# Patient Record
Sex: MALE | Race: OTHER | HISPANIC OR LATINO | URBAN - METROPOLITAN AREA
[De-identification: names, ages, dates, MRNs, and addresses within clinical notes are randomized per-mention and may not be internally consistent; named-entity substitution may affect disease eponyms.]

---

## 2022-12-05 ENCOUNTER — EMERGENCY (EMERGENCY)
Facility: HOSPITAL | Age: 22
LOS: 0 days | Discharge: HOME | End: 2022-12-05
Attending: EMERGENCY MEDICINE
Payer: COMMERCIAL

## 2022-12-05 VITALS
OXYGEN SATURATION: 100 % | TEMPERATURE: 99 F | SYSTOLIC BLOOD PRESSURE: 137 MMHG | HEART RATE: 80 BPM | DIASTOLIC BLOOD PRESSURE: 84 MMHG | RESPIRATION RATE: 18 BRPM

## 2022-12-05 VITALS
DIASTOLIC BLOOD PRESSURE: 78 MMHG | TEMPERATURE: 98 F | RESPIRATION RATE: 18 BRPM | HEART RATE: 79 BPM | SYSTOLIC BLOOD PRESSURE: 137 MMHG | OXYGEN SATURATION: 100 % | HEIGHT: 64 IN | WEIGHT: 160.06 LBS

## 2022-12-05 DIAGNOSIS — M25.512 PAIN IN LEFT SHOULDER: ICD-10-CM
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
## 2022-12-05 DIAGNOSIS — S62.102A FRACTURE OF UNSPECIFIED CARPAL BONE, LEFT WRIST, INITIAL ENCOUNTER FOR CLOSED FRACTURE: ICD-10-CM

## 2022-12-05 DIAGNOSIS — M25.522 PAIN IN LEFT ELBOW: ICD-10-CM
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
## 2022-12-05 DIAGNOSIS — S52.502A UNSPECIFIED FRACTURE OF THE LOWER END OF LEFT RADIUS, INITIAL ENCOUNTER FOR CLOSED FRACTURE: ICD-10-CM

## 2022-12-05 DIAGNOSIS — S13.9XXA SPRAIN OF JOINTS AND LIGAMENTS OF UNSPECIFIED PARTS OF NECK, INITIAL ENCOUNTER: ICD-10-CM

## 2022-12-05 DIAGNOSIS — S52.602A UNSPECIFIED FRACTURE OF LOWER END OF LEFT ULNA, INITIAL ENCOUNTER FOR CLOSED FRACTURE: ICD-10-CM
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
## 2022-12-05 LAB
ALBUMIN SERPL ELPH-MCNC: 4.1 G/DL — SIGNIFICANT CHANGE UP (ref 3.3–5)
ALP SERPL-CCNC: 130 U/L — HIGH (ref 40–120)
ALT FLD-CCNC: 39 U/L — SIGNIFICANT CHANGE UP (ref 12–78)
ANION GAP SERPL CALC-SCNC: 4 MMOL/L — LOW (ref 5–17)
APTT BLD: 30.4 SEC — SIGNIFICANT CHANGE UP (ref 27.5–35.5)
AST SERPL-CCNC: 22 U/L — SIGNIFICANT CHANGE UP (ref 15–37)
BASOPHILS # BLD AUTO: 0.05 K/UL — SIGNIFICANT CHANGE UP (ref 0–0.2)
BASOPHILS NFR BLD AUTO: 0.5 % — SIGNIFICANT CHANGE UP (ref 0–2)
BILIRUB SERPL-MCNC: 0.2 MG/DL — SIGNIFICANT CHANGE UP (ref 0.2–1.2)
BUN SERPL-MCNC: 16 MG/DL — SIGNIFICANT CHANGE UP (ref 7–23)
CALCIUM SERPL-MCNC: 9.1 MG/DL — SIGNIFICANT CHANGE UP (ref 8.5–10.1)
CHLORIDE SERPL-SCNC: 108 MMOL/L — SIGNIFICANT CHANGE UP (ref 96–108)
CO2 SERPL-SCNC: 27 MMOL/L — SIGNIFICANT CHANGE UP (ref 22–31)
CREAT SERPL-MCNC: 0.94 MG/DL — SIGNIFICANT CHANGE UP (ref 0.5–1.3)
EGFR: 118 ML/MIN/1.73M2 — SIGNIFICANT CHANGE UP
EOSINOPHIL # BLD AUTO: 0.26 K/UL — SIGNIFICANT CHANGE UP (ref 0–0.5)
EOSINOPHIL NFR BLD AUTO: 2.9 % — SIGNIFICANT CHANGE UP (ref 0–6)
GLUCOSE SERPL-MCNC: 94 MG/DL — SIGNIFICANT CHANGE UP (ref 70–99)
HCT VFR BLD CALC: 43.8 % — SIGNIFICANT CHANGE UP (ref 39–50)
HGB BLD-MCNC: 14.9 G/DL — SIGNIFICANT CHANGE UP (ref 13–17)
IMM GRANULOCYTES NFR BLD AUTO: 0.1 % — SIGNIFICANT CHANGE UP (ref 0–0.9)
INR BLD: 1.03 RATIO — SIGNIFICANT CHANGE UP (ref 0.88–1.16)
LYMPHOCYTES # BLD AUTO: 2.57 K/UL — SIGNIFICANT CHANGE UP (ref 1–3.3)
LYMPHOCYTES # BLD AUTO: 28.2 % — SIGNIFICANT CHANGE UP (ref 13–44)
MCHC RBC-ENTMCNC: 29.6 PG — SIGNIFICANT CHANGE UP (ref 27–34)
MCHC RBC-ENTMCNC: 34 GM/DL — SIGNIFICANT CHANGE UP (ref 32–36)
MCV RBC AUTO: 87.1 FL — SIGNIFICANT CHANGE UP (ref 80–100)
MONOCYTES # BLD AUTO: 0.71 K/UL — SIGNIFICANT CHANGE UP (ref 0–0.9)
MONOCYTES NFR BLD AUTO: 7.8 % — SIGNIFICANT CHANGE UP (ref 2–14)
NEUTROPHILS # BLD AUTO: 5.5 K/UL — SIGNIFICANT CHANGE UP (ref 1.8–7.4)
NEUTROPHILS NFR BLD AUTO: 60.5 % — SIGNIFICANT CHANGE UP (ref 43–77)
PLATELET # BLD AUTO: 262 K/UL — SIGNIFICANT CHANGE UP (ref 150–400)
POTASSIUM SERPL-MCNC: 3.8 MMOL/L — SIGNIFICANT CHANGE UP (ref 3.5–5.3)
POTASSIUM SERPL-SCNC: 3.8 MMOL/L — SIGNIFICANT CHANGE UP (ref 3.5–5.3)
PROT SERPL-MCNC: 8 GM/DL — SIGNIFICANT CHANGE UP (ref 6–8.3)
PROTHROM AB SERPL-ACNC: 12 SEC — SIGNIFICANT CHANGE UP (ref 10.5–13.4)
RBC # BLD: 5.03 M/UL — SIGNIFICANT CHANGE UP (ref 4.2–5.8)
RBC # FLD: 13 % — SIGNIFICANT CHANGE UP (ref 10.3–14.5)
SODIUM SERPL-SCNC: 139 MMOL/L — SIGNIFICANT CHANGE UP (ref 135–145)
WBC # BLD: 9.1 K/UL — SIGNIFICANT CHANGE UP (ref 3.8–10.5)
WBC # FLD AUTO: 9.1 K/UL — SIGNIFICANT CHANGE UP (ref 3.8–10.5)

## 2022-12-05 PROCEDURE — 73070 X-RAY EXAM OF ELBOW: CPT | Mod: 26,LT

## 2022-12-05 PROCEDURE — 96374 THER/PROPH/DIAG INJ IV PUSH: CPT | Mod: XU

## 2022-12-05 PROCEDURE — 99285 EMERGENCY DEPT VISIT HI MDM: CPT | Mod: 25

## 2022-12-05 PROCEDURE — 70496 CT ANGIOGRAPHY HEAD: CPT | Mod: 26,MA

## 2022-12-05 PROCEDURE — 99285 EMERGENCY DEPT VISIT HI MDM: CPT

## 2022-12-05 PROCEDURE — 74177 CT ABD & PELVIS W/CONTRAST: CPT | Mod: 26,MA

## 2022-12-05 PROCEDURE — 71260 CT THORAX DX C+: CPT | Mod: 26,MA

## 2022-12-05 PROCEDURE — 73060 X-RAY EXAM OF HUMERUS: CPT | Mod: 26,LT

## 2022-12-05 PROCEDURE — 73030 X-RAY EXAM OF SHOULDER: CPT | Mod: 26,LT

## 2022-12-05 PROCEDURE — 72125 CT NECK SPINE W/O DYE: CPT | Mod: 26,MA

## 2022-12-05 PROCEDURE — 72125 CT NECK SPINE W/O DYE: CPT | Mod: MA

## 2022-12-05 PROCEDURE — 73130 X-RAY EXAM OF HAND: CPT | Mod: LT

## 2022-12-05 PROCEDURE — 96376 TX/PRO/DX INJ SAME DRUG ADON: CPT | Mod: XU

## 2022-12-05 PROCEDURE — 70496 CT ANGIOGRAPHY HEAD: CPT | Mod: MA

## 2022-12-05 PROCEDURE — 85610 PROTHROMBIN TIME: CPT

## 2022-12-05 PROCEDURE — 70450 CT HEAD/BRAIN W/O DYE: CPT | Mod: MA

## 2022-12-05 PROCEDURE — 36415 COLL VENOUS BLD VENIPUNCTURE: CPT

## 2022-12-05 PROCEDURE — 73060 X-RAY EXAM OF HUMERUS: CPT | Mod: LT

## 2022-12-05 PROCEDURE — 73090 X-RAY EXAM OF FOREARM: CPT | Mod: 26,LT,76

## 2022-12-05 PROCEDURE — 73070 X-RAY EXAM OF ELBOW: CPT | Mod: LT

## 2022-12-05 PROCEDURE — 99284 EMERGENCY DEPT VISIT MOD MDM: CPT

## 2022-12-05 PROCEDURE — 85730 THROMBOPLASTIN TIME PARTIAL: CPT

## 2022-12-05 PROCEDURE — 80053 COMPREHEN METABOLIC PANEL: CPT

## 2022-12-05 PROCEDURE — 71260 CT THORAX DX C+: CPT | Mod: MA

## 2022-12-05 PROCEDURE — 73100 X-RAY EXAM OF WRIST: CPT | Mod: 26,LT

## 2022-12-05 PROCEDURE — 73030 X-RAY EXAM OF SHOULDER: CPT | Mod: LT

## 2022-12-05 PROCEDURE — 70450 CT HEAD/BRAIN W/O DYE: CPT | Mod: 26,MA

## 2022-12-05 PROCEDURE — 85025 COMPLETE CBC W/AUTO DIFF WBC: CPT

## 2022-12-05 PROCEDURE — 73100 X-RAY EXAM OF WRIST: CPT | Mod: 26,LT,77

## 2022-12-05 PROCEDURE — 73100 X-RAY EXAM OF WRIST: CPT | Mod: LT

## 2022-12-05 PROCEDURE — 74177 CT ABD & PELVIS W/CONTRAST: CPT | Mod: MA

## 2022-12-05 PROCEDURE — 73130 X-RAY EXAM OF HAND: CPT | Mod: 26,LT

## 2022-12-05 PROCEDURE — 25605 CLTX DST RDL FX/EPHYS SEP W/: CPT | Mod: LT

## 2022-12-05 PROCEDURE — 73090 X-RAY EXAM OF FOREARM: CPT | Mod: LT

## 2022-12-05 RX ORDER — HYDROMORPHONE HYDROCHLORIDE 2 MG/ML
1 INJECTION INTRAMUSCULAR; INTRAVENOUS; SUBCUTANEOUS ONCE
Refills: 0 | Status: DISCONTINUED | OUTPATIENT
Start: 2022-12-05 | End: 2022-12-05

## 2022-12-05 RX ORDER — SODIUM CHLORIDE 9 MG/ML
1000 INJECTION INTRAMUSCULAR; INTRAVENOUS; SUBCUTANEOUS ONCE
Refills: 0 | Status: COMPLETED | OUTPATIENT
Start: 2022-12-05 | End: 2022-12-05

## 2022-12-05 RX ORDER — OXYCODONE AND ACETAMINOPHEN 5; 325 MG/1; MG/1
1 TABLET ORAL
Qty: 12 | Refills: 0
Start: 2022-12-05

## 2022-12-05 RX ADMIN — SODIUM CHLORIDE 1000 MILLILITER(S): 9 INJECTION INTRAMUSCULAR; INTRAVENOUS; SUBCUTANEOUS at 14:35

## 2022-12-05 RX ADMIN — HYDROMORPHONE HYDROCHLORIDE 1 MILLIGRAM(S): 2 INJECTION INTRAMUSCULAR; INTRAVENOUS; SUBCUTANEOUS at 14:00

## 2022-12-05 RX ADMIN — HYDROMORPHONE HYDROCHLORIDE 1 MILLIGRAM(S): 2 INJECTION INTRAMUSCULAR; INTRAVENOUS; SUBCUTANEOUS at 16:49

## 2022-12-05 NOTE — ED PROVIDER NOTE - NS_ ATTENDINGSCRIBEDETAILS _ED_A_ED_FT
I, Brad Davis MD,  performed the initial face to face bedside interview with this patient regarding history of present illness, review of symptoms and relevant past medical, social and family history.  I completed an independent physical examination.  I was the initial provider who evaluated this patient.   I personally saw the patient and performed a substantive portion of the visit including all aspects of the medical decision making.  The history, relevant review of systems, past medical and surgical history, medical decision making, and physical examination was documented by the scribe in my presence and I attest to the accuracy of the documentation.

## 2022-12-05 NOTE — ED PROVIDER NOTE - ENMT, MLM
Airway patent, Nasal mucosa clear. Mouth with normal mucosa. Throat has no vesicles, no oropharyngeal exudates and uvula is midline. Airway patent, Nasal mucosa clear. Mouth with normal mucosa. Throat is clear.

## 2022-12-05 NOTE — ED ADULT NURSE NOTE - OBJECTIVE STATEMENT
Pt BIBEMS s/p fall. Pt was working on a roof when he fell off of a ladder, about 10 feet. Pt denies LOC or headstrike. Pt w sling to L arm present, c/o l elbow pain. Trauma alert activated. Dr Davis notified. Pt brought to CT scan. IV inserted and blood speciment sent. See trauma sheet.

## 2022-12-05 NOTE — ED PROVIDER NOTE - CARE PROVIDER_API CALL
Juan Ramon Quiroz)  Orthopaedic Surgery; Surgery of the Hand  [address] [place] [address]  Phone: [phone]  Fax: [phone]  Follow Up Time: Urgent

## 2022-12-05 NOTE — ED ADULT TRIAGE NOTE - CHIEF COMPLAINT QUOTE
Pt BIBEMS s/p fall. Pt was working on a roof when he fell off of a ladder, about 10 feet. Pt denies LOC or headstrike. Pt w sling to l arm present, c/o l elbow pain. Trauma alert activated. Dr Susan pond.

## 2022-12-05 NOTE — CONSULT NOTE ADULT - SUBJECTIVE AND OBJECTIVE BOX
22y Male presents c/o LEFT wrist pain sp fall from height. He was zeus and fell about 10 feet. Witnessed by his brother. No LOC. PT seen by ED and pan scanned. Fall happened today.  Had immediate constant pain to the left wrist and some swelling and noted mild deformity as well. No alleviating factors including medication or positioning. He was splinted in the field and collared by EMS. Notably after the fall he was able to get up and walk. xrays here show minimally displaced left distal radius fracture. Denies LOC. Denies numbness/tingling. No other pain/injuries. Right Hand dominant. Pt seen and examined w Ortho Residents/PAs. Pt was treated with closed reduction under local anesthetic in the ED and splinted.    ROS: No CP, no SOB, No night sweats, no fevers or chills, no Numbness or tingling.     HEALTH ISSUES - PROBLEM Dx:        MEDICATIONS  (STANDING):    Allergies    No Known Allergies    Intolerances                              14.9   9.10  )-----------( 262      ( 05 Dec 2022 13:37 )             43.8     05 Dec 2022 13:37    139    |  108    |  16     ----------------------------<  94     3.8     |  27     |  0.94     Ca    9.1        05 Dec 2022 13:37    TPro  8.0    /  Alb  4.1    /  TBili  0.2    /  DBili  x      /  AST  22     /  ALT  39     /  AlkPhos  130    05 Dec 2022 13:37    PT/INR - ( 05 Dec 2022 13:37 )   PT: 12.0 sec;   INR: 1.03 ratio         PTT - ( 05 Dec 2022 13:37 )  PTT:30.4 sec  Vital Signs Last 24 Hrs  T(C): 36.9 (12-05-22 @ 13:32), Max: 36.9 (12-05-22 @ 13:32)  T(F): 98.4 (12-05-22 @ 13:32), Max: 98.4 (12-05-22 @ 13:32)  HR: 79 (12-05-22 @ 13:32) (79 - 79)  BP: 137/78 (12-05-22 @ 13:32) (137/78 - 137/78)  BP(mean): --  RR: 18 (12-05-22 @ 13:32) (18 - 18)  SpO2: 100% (12-05-22 @ 13:32) (100% - 100%)    Imaging: Xrays demonstrate Left distal radius fracture impacted and mildly dorsally angulated    Physical Exam  Gen: Nad, Alert, oriented, Follows Commands calm and pleasant  Head: Atraumatic, NC, no facial trauma  Neck/C-Spine: Palpated through the callar and no bony TTP no paraspinal TTP  T/L Spine: Nontender no step off  Breathing: Nonlaboraed  Pulse: Regular  Abdomen: Nondistended  Musculoskeletal:  LUE: Wrist swelling and mild deformity noted.  ELbow with small superficial abrasion otherwise. Skin intact, +TTP distal radius, no bony ttp elsewhere, compartments soft/compressive, extremity warm/well perfused. Sensation intact distal tips. Able to wiggle her fingers, intact AIN/PIN. No elbow or shoulder bony tenderness or swelling. Full painless AROM of elbow and shoulder.2+ radial pulse.   RUE: Moving at baseline shoulder, elbow, wrist, digits. No deformity or swelling. Painless baseline AROM shoulder, elbow, wrist.  Bilat LE: No swelling or deformity. Painless baseline APROM of hips, knees, ankles, toes. Able to actively SLR.  Negative HS/Log roll. 2+ DP pulses.     Procedure: 10 cc 1% lidocaine injected under sterile procedure into LEFT Distal radius fracture site. Time was allowed to achieve anesthetic effect.  Hand was hung using IV pole, rc and weights. Closed reduction performed. Placed in well padded sugartong splint, molded appropriately. Post procedure imaging obtained. Post procedure exam unchanged, NV intact, able to wiggles all fingers, SILT distally.        22y Male presents c/o LEFT wrist pain sp fall from height. He was zeus and fell about 10 feet. Witnessed by his brother. No LOC. PT seen by ED and pan scanned. Fall happened today.  Had immediate constant pain to the left wrist and some swelling and noted mild deformity as well. No alleviating factors including medication or positioning. He was splinted in the field and collared by EMS. Notably after the fall he was able to get up and walk. xrays here show minimally displaced left distal radius fracture. Denies LOC. Denies numbness/tingling. No other pain/injuries. Right Hand dominant. Pt seen and examined w Ortho Residents/PAs. Pt was treated with closed reduction under local anesthetic in the ED and splinted.    ROS: No CP, no SOB, No night sweats, no fevers or chills, no Numbness or tingling.     HEALTH ISSUES - PROBLEM Dx:        MEDICATIONS  (STANDING):    Allergies    No Known Allergies    Intolerances                              14.9   9.10  )-----------( 262      ( 05 Dec 2022 13:37 )             43.8     05 Dec 2022 13:37    139    |  108    |  16     ----------------------------<  94     3.8     |  27     |  0.94     Ca    9.1        05 Dec 2022 13:37    TPro  8.0    /  Alb  4.1    /  TBili  0.2    /  DBili  x      /  AST  22     /  ALT  39     /  AlkPhos  130    05 Dec 2022 13:37    PT/INR - ( 05 Dec 2022 13:37 )   PT: 12.0 sec;   INR: 1.03 ratio         PTT - ( 05 Dec 2022 13:37 )  PTT:30.4 sec  Vital Signs Last 24 Hrs  T(C): 36.9 (12-05-22 @ 13:32), Max: 36.9 (12-05-22 @ 13:32)  T(F): 98.4 (12-05-22 @ 13:32), Max: 98.4 (12-05-22 @ 13:32)  HR: 79 (12-05-22 @ 13:32) (79 - 79)  BP: 137/78 (12-05-22 @ 13:32) (137/78 - 137/78)  BP(mean): --  RR: 18 (12-05-22 @ 13:32) (18 - 18)  SpO2: 100% (12-05-22 @ 13:32) (100% - 100%)    Imaging: Xrays demonstrate Left distal radius fracture impacted and mildly dorsally angulated    Physical Exam  Gen: Nad, Alert, oriented, Follows Commands calm and pleasant  Head: Atraumatic, NC, no facial trauma  Neck/C-Spine: Palpated through the callar and no bony TTP no paraspinal TTP  T/L Spine: Nontender no step off  Breathing: Nonlaboraed  Pulse: Regular  Abdomen: Nondistended  Musculoskeletal:  LUE: Wrist swelling and mild deformity noted.  ELbow with small superficial abrasion otherwise. Skin intact, +TTP distal radius, no bony ttp elsewhere, compartments soft/compressive, extremity warm/well perfused. Sensation intact distal tips. Able to wiggle her fingers, intact AIN/PIN. No elbow or shoulder bony tenderness or swelling. Full painless AROM of elbow and shoulder.2+ radial pulse.   RUE: Moving at baseline shoulder, elbow, wrist, digits. No deformity or swelling. Painless baseline AROM shoulder, elbow, wrist.  Bilat LE: No swelling or deformity. Painless baseline APROM of hips, knees, ankles, toes. Able to actively SLR.  Negative HS/Log roll. 2+ DP pulses.     Procedure: Pt received IV dilaudid by ED/RN immediately prior to closed reduction. Then Ortho performed sterile skin prep using alcohol and injected 10 cc 1% lidocaine into LEFT Distal radius fracture site. Time was allowed to achieve anesthetic effect.  Hand was hung using IV pole, rc and weights. Closed reduction performed felt slight movement of fx peice. Placed in well padded sugartong splint, molded appropriately. Post procedure imaging obtained. Post procedure exam unchanged, NV intact, able to wiggles all fingers, SILT distally.        22y Male presents c/o LEFT wrist pain sp fall from height. He was zeus and fell about 10 feet. Witnessed by his brother. No LOC. PT seen by ED and pan scanned. Fall happened today.  Had immediate constant pain to the left wrist and some swelling and noted mild deformity as well. No alleviating factors including medication or positioning. He was splinted in the field and collared by EMS. Notably after the fall he was able to get up and walk. xrays here show minimally displaced left distal radius fracture. Denies LOC. Denies numbness/tingling. No other pain/injuries. Right Hand dominant. Pt seen and examined w Ortho Residents/PAs. Pt was treated with closed reduction under local anesthetic in the ED and splinted.    ROS: No CP, no SOB, No night sweats, no fevers or chills, no Numbness or tingling.     HEALTH ISSUES - PROBLEM Dx:        MEDICATIONS  (STANDING):    Allergies    No Known Allergies    Intolerances                              14.9   9.10  )-----------( 262      ( 05 Dec 2022 13:37 )             43.8     05 Dec 2022 13:37    139    |  108    |  16     ----------------------------<  94     3.8     |  27     |  0.94     Ca    9.1        05 Dec 2022 13:37    TPro  8.0    /  Alb  4.1    /  TBili  0.2    /  DBili  x      /  AST  22     /  ALT  39     /  AlkPhos  130    05 Dec 2022 13:37    PT/INR - ( 05 Dec 2022 13:37 )   PT: 12.0 sec;   INR: 1.03 ratio         PTT - ( 05 Dec 2022 13:37 )  PTT:30.4 sec  Vital Signs Last 24 Hrs  T(C): 36.9 (12-05-22 @ 13:32), Max: 36.9 (12-05-22 @ 13:32)  T(F): 98.4 (12-05-22 @ 13:32), Max: 98.4 (12-05-22 @ 13:32)  HR: 79 (12-05-22 @ 13:32) (79 - 79)  BP: 137/78 (12-05-22 @ 13:32) (137/78 - 137/78)  BP(mean): --  RR: 18 (12-05-22 @ 13:32) (18 - 18)  SpO2: 100% (12-05-22 @ 13:32) (100% - 100%)    Imaging: Xrays demonstrate Left distal radius fracture/ulnar styoid fx impacted and mildly dorsally angulated;   On CT Chest, possible base 3 metacarpal fx     Physical Exam  Gen: Nad, Alert, oriented, Follows Commands calm and pleasant  Head: Atraumatic, NC, no facial trauma  Neck/C-Spine: Palpated through the callar and no bony TTP no paraspinal TTP  T/L Spine: Nontender no step off  Breathing: Nonlaboraed  Pulse: Regular  Abdomen: Nondistended  Musculoskeletal:  LUE: Wrist swelling and mild deformity noted.  ELbow with small superficial abrasion otherwise. Skin intact, +TTP distal radius, no bony ttp elsewhere, compartments soft/compressive, extremity warm/well perfused. Sensation intact distal tips. Able to wiggle her fingers, intact AIN/PIN. No elbow or shoulder bony tenderness or swelling. Full painless AROM of elbow and shoulder.2+ radial pulse.   RUE: Moving at baseline shoulder, elbow, wrist, digits. No deformity or swelling. Painless baseline AROM shoulder, elbow, wrist.  Bilat LE: No swelling or deformity. Painless baseline APROM of hips, knees, ankles, toes. Able to actively SLR.  Negative HS/Log roll. 2+ DP pulses.     Procedure: Pt received IV dilaudid by ED/RN immediately prior to closed reduction. Then Ortho performed sterile skin prep using alcohol and injected 10 cc 1% lidocaine into LEFT Distal radius fracture site. Time was allowed to achieve anesthetic effect.  Hand was hung using IV pole, rc and weights. Closed reduction performed felt slight movement of fx peice. Placed in well padded sugartong splint, molded appropriately. Post procedure imaging obtained. Post procedure exam unchanged, NV intact, able to wiggles all fingers, SILT distally.

## 2022-12-05 NOTE — ED PROVIDER NOTE - HEME LYMPH, MLM
No adenopathy or splenomegaly. No cervical or inguinal lymphadenopathy. No adenopathy or splenomegaly. ~Damon Henry PA-C

## 2022-12-05 NOTE — ED ADULT NURSE NOTE - NSIMPLEMENTINTERV_GEN_ALL_ED
Implemented All Fall Risk Interventions:  Hiram to call system. Call bell, personal items and telephone within reach. Instruct patient to call for assistance. Room bathroom lighting operational. Non-slip footwear when patient is off stretcher. Physically safe environment: no spills, clutter or unnecessary equipment. Stretcher in lowest position, wheels locked, appropriate side rails in place. Provide visual cue, wrist band, yellow gown, etc. Monitor gait and stability. Monitor for mental status changes and reorient to person, place, and time. Review medications for side effects contributing to fall risk. Reinforce activity limits and safety measures with patient and family.

## 2022-12-05 NOTE — ED PROVIDER NOTE - OBJECTIVE STATEMENT
23 y/o male w/ no pertinent PMHx presents to the ED via EMS s/p fall. Pt reports he was working out a roof when he fell approx 10 foot roof ladder, denies head strike or LOC. Pt left arm in a sling. Pt c/o left elbow pain. No other complaints at this time. On arrival pt in C-collar and left arm in sling. 21 y/o male w/ no pertinent PMHx presents to the ED via EMS s/p fall. Pt reports he was working out a roof when he fell approx 10 foot roof ladder, denies head strike or LOC. Pt left arm in a sling. Pt c/o left elbow pain.  On arrival pt in C-collar and left arm in sling. trauma alert called

## 2022-12-05 NOTE — ED PROVIDER NOTE - CLINICAL SUMMARY MEDICAL DECISION MAKING FREE TEXT BOX
21 y/o male presents to the ED s/p 10 foot fall from roof. On exam pt w/ TTP to left shoulder and elbow, normal peripheral pulse, no other signs of traumatic injury. Trauma alert called. Will CT and reassess. 23 y/o male presents to the ED s/p 10 foot fall from roof. On exam pt w/ TTP to left shoulder and elbow, normal peripheral pulse, no other signs of traumatic injury. Trauma alert called. Will CT and reassess.      PA note: Left wrist fracture reduced by ortho team, see consult note. All labwork/radiology results discussed in detail with patient. Patient re-examined and re-evaluated. Patient feels much better at this time. ED evaluation, Diagnosis and management discussed with the patient in detail. Workup results discussed with ED attending, OK to [place] home. Close ORTHO follow up encouraged, aftercare to assist with scheduling appointment ASAP. Strict ED return instructions discussed in detail and patient given the opportunity to ask any questions about their discharge diagnosis and instructions. Patient verbalized understanding. ~ Damon Henry PA-C

## 2022-12-05 NOTE — ED PROVIDER NOTE - PATIENT PORTAL LINK FT
You can access the FollowMyHealth Patient Portal offered by [facility] by registering at the following website: [email or web address]. By joining [facility]’s FollowMyHealth portal, you will also be able to view your health information using other applications (apps) compatible with our system.

## 2022-12-05 NOTE — ED PROVIDER NOTE - PHYSICAL EXAMINATION
Constitutional: NAD AAOx3  Eyes: PERRLA EOMI  Head: Normocephalic atraumatic  Mouth: MMM  Cardiac: regular rate   Resp: Lungs CTAB  GI: Abd s/nt/nd  Neuro: CN2-12 intact  Skin: No visible rashes   MSK: TTP to left shoulder and elbow, normal peripheral pulses, no other signs of traumatic injury. Constitutional: NAD AAOx3  Eyes: PERRLA EOMI  Head: Normocephalic atraumatic  ENT: No sneed sign, no raccoon eyes  Mouth: MMM  Cardiac: regular rate   Resp: Lungs CTAB  GI: Abd s/nt/nd  Neuro: CN2-12 intact GCS 15 no neuro deficits  Skin: No rashes, no bruising to chest, back, abdomen or extremities  Msk: No midline spinal ttp, no ttp of facial bones, no ttp to chest wall, pelvis stable, TTP to left shoulder and elbow, normal peripheral pulses, no other signs of traumatic injury. Attending: Constitutional: NAD AAOx3  Eyes: PERRLA EOMI  Head: Normocephalic atraumatic  ENT: No sneed sign, no raccoon eyes  Mouth: MMM  Cardiac: regular rate   Resp: Lungs CTAB  GI: Abd s/nt/nd  Neuro: CN2-12 intact GCS 15 no neuro deficits  Skin: No rashes, no bruising to chest, back, abdomen or extremities  Msk: No midline spinal ttp, no ttp of facial bones, no ttp to chest wall, pelvis stable, TTP to left shoulder and elbow, normal peripheral pulses, no other signs of traumatic injury.

## 2022-12-05 NOTE — ED PROVIDER NOTE - CONSTITUTIONAL, MLM
normal... Well appearing, awake, alert, oriented to person, place, time/situation and in no apparent distress. PA: Well appearing, awake, alert, oriented to person, place, time/situation and in no apparent distress.

## 2022-12-05 NOTE — ED PROVIDER NOTE - MUSCULOSKELETAL, MLM
Spine appears normal, range of motion is not limited, no muscle or joint tenderness Spine appears normal, range of motion is not limited. LEFT WRIST: +Obvious deformity left wrist. +STS. +Tenderness. Painful ROM. NVI. 2+ distal pulses.

## 2022-12-05 NOTE — ED PROVIDER NOTE - PROGRESS NOTE DETAILS
+Distal radius/ulnar fracture. Spoke with Dr. Quiroz, will send resident down. CT head abnormal findings reviewed and discussed with patient, will get CT venogram. ~Damon Henry PA-C 23 y/o male w/ no pertinent PMHx presents to the ED via EMS s/p fall. Pt reports he was working out a roof when he fell approx 10 foot roof ladder, denies head strike or LOC. Pt left arm in a sling. Pt c/o left elbow pain.  On arrival pt in C-collar and left arm in sling. trauma alert called PA: Patient is a 23 y/o male with no pertinent PMHx who presents to ED c/o left arm injury after he fell from 10 feet height. Patient came to ED via EMS s/p fall. Pt reports he was working out a roof when he fell approx 10 foot roof ladder, denies head strike or LOC. Patient was placed in cervical collar in ED. TA was called. ~Damon Henry PA-C Waiting for ORTHO. ~Damon Henry PA-C PA note: Left wrist fracture reduced by ortho team, see consult note. All labwork/radiology results discussed in detail with patient. Patient re-examined and re-evaluated. Patient feels much better at this time. ED evaluation, Diagnosis and management discussed with the patient in detail. Workup results discussed with ED attending, OK to [place] home. Close ORTHO follow up encouraged, aftercare to assist with scheduling appointment ASAP. Strict ED return instructions discussed in detail and patient given the opportunity to ask any questions about their discharge diagnosis and instructions. Patient verbalized understanding. ~ Damon Henry PA-C

## 2022-12-05 NOTE — ED PROVIDER NOTE - NSFOLLOWUPINSTRUCTIONS_ED_ALL_ED_FT
WRIST FRACTURE IN ADULTS - General Information            Fractura de mike en adultos    LO QUE NECESITA SABER:    ¿Qué es cal fractura de mike?Se produce cal fractura de mike cuando se quiebran nora o más huesos de la mike.   Huesos del brazo del adulto         ¿Cuáles son los signos y síntomas de cal fractura de mike?  •Dolor, hinchazón y moretones en la mike lesionada      •Dolor en la mike que empeora cuando sostiene algo o ejerce presión en la mike      •Debilidad, pérdida de la sensación u hormigueo en la mano o mike lesionada      •Dificultad para  la mike, la mano o los dedos      •Un cambio en la forma de la mike      ¿Cómo se diagnostica cal fractura de mike?Costello médico lo examinará. Es posible que tengan que realizarle cal radiografía, cal tomografía computarizada o cal resonancia magnética. Es posible que le den líquido de contraste para a que los huesos de costello mike se vean con más claridad en las imágenes. Dígale al médico si usted alguna vez ha tenido cal reacción alérgica al líquido de contraste. No entre a la ha donde se realiza la resonancia magnética con algo de metal. El metal puede causar lesiones serias. Dígale al médico si usted tiene algo de metal dentro de costello cuerpo o por encima.    ¿Cómo se trata cal fractura de mike?El tratamiento dependerá del hueso que se haya fracturado y el tipo de fractura que tenga. Es posible que usted necesite alguno de los siguientes:   •Medicamentospara ayudar a disminuir el dolor y la inflamación. Es posible también que necesite garland un antibiótico o darse la vacuna antitetánica si se abrió la piel.      •Puede que le coloquen un yeso, cal abrazadera o cal férulaen la mike para limitar el movimiento. Estos dispositivos ayudarán a mantener los huesos en costello lugar mientras sanan.      •La tracciónpuede ser necesaria si costello hueso se quebró en dos pedazos. La tracción ifeoma los huesos para colocarlos nuevamente en costello lugar. Es posible que se coloque un clavo en costello hueso o yeso y se enganche a unas cuerdas y a cal polea. El peso se jonelle en las cuerdas para ayudar a jalar los huesos y así ayudarlos a sanar correctamente.      •Cal reducción cerradaes un procedimiento que se realiza colocar los huesos en la posición correcta sin necesidad de cirugía.      •La cirugíapodría ser necesaria para regresar mohan huesos a costello posición correcta. Es posible que se usen alambres, clavijas, tornillos o placas para sujetar los huesos en costello lugar.      ¿Cómo puedo controlar los síntomas?  •Descanselo más posible. No practique deportes de contacto hasta que costello médico lo autorice a hacerlo.      •Aplique hieloen la mike de 15 a 20 minutos cada hora o doroteo se le indique. Use cal compresa de hielo o ponga hielo triturado en cal bolsa de plástico. Envuelva el hielo con cal toalla antes de colocarlo sobre costello piel. El hielo ayuda a evitar daño al tejido y a disminuir la inflamación y el dolor.      •Elevesu mike por encima del nivel de costello corazón con la mayor frecuencia posible. Repton va a disminuir inflamación y el dolor. Apoye costello mike sobre almohadas o mantas para mantenerla elevada cómodamente.             •Vaya a terapia físicasegún las indicaciones. Es posible que deba hacer fisioterapia cal vez que se le haya curado la mike y le hayan quitado el yeso. Un fisioterapeuta puede enseñarle ejercicios para mejorar el movimiento y la fuerza y para aliviar el dolor.      ¿Cuándo tiara buscar atención inmediata?  •Costello dolor empeora o no mejora después de garland el medicamento para el dolor.      •El yeso o la férula se rompen, se mojan o se dañan.      •Tiene la mano o los dedos de la mano entumecidos.      •Las rey o los dedos se le ponen blancos o azules.      •El yeso o la férula se sienten muy apretados.      •Tiene más dolor o inflamación que antes de que le colocaran el yeso o la férula.      ¿Cuándo tiara llamar a mi médico?  •Tiene fiebre.      •Sale mal olor o shiva de debajo del yeso.      •Usted tiene preguntas o inquietudes acerca de costello condición o cuidado.      ACUERDOS SOBRE COSTELLO CUIDADO:    Usted tiene el derecho de ayudar a planear costello cuidado. Aprenda todo lo que pueda sobre costello condición y doroteo darle tratamiento. Discuta mohan opciones de tratamiento con mohan médicos para decidir el cuidado que usted desea recibir. Usted siempre tiene el derecho de rechazar el tratamiento.

## 2022-12-05 NOTE — CONSULT NOTE ADULT - ASSESSMENT
A/P: 22y Male s/p closed reduction Left distal radius fracture, closed.  Consider trauma eval due to fall from height  Pain control  NWB Left UE in splint, keep c/d/I,   Elevation of hand and encouraged to wiggle digits few times per hour  Advised to keep splint on and to keep it dry  Si/sx compartment syndrome discussed with patient, told to return to ED if exhibit any  Possible need for surgical intervention as outpt discussed today, and to be further discussed in office w   Follow up with Dr. EDWARDS within 3-5 days , call office for appointment   Plan relayed to ED   Ortho stable for DC  Above as discussed with orthopedic Attending Dr dEwards A/P: 22y Male s/p closed reduction Left distal radius/ulna fracture, closed, possible ND base 3rd metacarpal fx     Consider trauma eval due to fall from height  Pain control  NWB Left UE in splint, keep c/d/I,   Elevation of hand and encouraged to wiggle digits few times per hour  Advised to keep splint on and to keep it dry  Si/sx compartment syndrome discussed with patient, told to return to ED if exhibit any  Possible need for surgical intervention as outpt discussed today, and to be further discussed in office w   Follow up with Dr. EDWARDS within 3-5 days , call office for appointment   Plan relayed to ED   Ortho stable for DC  Above as discussed with orthopedic Attending Dr Edwards
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
## 2025-05-01 NOTE — ED ADULT TRIAGE NOTE - NSWEIGHTCALCTOOLDRUG_GEN_A_CORE
used Dosing Month 1 (Required For Cumulative Dosing): 30mg Daily Retinoid Dermatitis Aggressive Treatment: I recommended more frequent application of Cetaphil or CeraVe to the areas of dermatitis. I also prescribed a topical steroid for twice daily use until the dermatitis resolves. Male Completion Statement: After discussing his treatment course we decided to discontinue isotretinoin therapy at this time. He shouldn't donate blood for one month after the last dose. He should call with any new symptoms of depression. Myalgia Monitoring: I explained this is common when taking isotretinoin. If this worsens they will contact us. Is Xerosis Present?: No Upper Range (In Mg/Kg): 150 Dosing Month 2 (Required For Cumulative Dosing): 40mg Daily Detail Level: Zone Cheilitis Normal Treatment: I recommended application of Vaseline or Aquaphor numerous times a day (as often as every hour) and before going to bed. Myalgia Treatment: I explained this is common when taking isotretinoin. If this worsens they will contact us. They may try OTC ibuprofen. Nosebleeds Normal Treatment: I explained this is common when taking isotretinoin. I recommended saline mist in each nostril multiple times a day. If this worsens they will contact us. Target Cumulative Dosage (In Mg/Kg): 135 Add Associated Diagnosis When Managing Medication Side Effects: Yes Dosing Month 3 (Required For Cumulative Dosing): 60mg Daily Hypertriglyceridemia Monitoring: I explained this is common when taking isotretinoin. We will monitor closely. What Is The Patient's Gender: Female Cheilitis Aggressive Treatment: I recommended application of Vaseline or Aquaphor numerous times a day (as often as every hour) and before going to bed. I also prescribed a topical steroid for twice daily use. Xerosis Normal Treatment: I recommended application of Cetaphil or CeraVe numerous times a day and before going to bed to all dry areas. Retinoid Dermatitis Normal Treatment: I recommended more frequent application of Cetaphil or CeraVe to the areas of dermatitis. Xerosis Aggressive Treatment: I recommended application of Cetaphil or CeraVe numerous times a day and before going to bed to all dry areas. I also prescribed a topical steroid for twice daily use. Are Labs Available For Review?: No- Labs Deferred This Month Counseling Text: I reviewed the side effect in detail. Patient should get monthly blood tests, not donate blood, not drive at night if vision affected, and not share medication. Headache Monitoring: I recommended monitoring the headaches for now. There is no evidence of increased intracranial pressure. They were instructed to call if the headaches are worsening. Female Pregnancy Counseling Text: Female patients should also be on two forms of birth control while taking this medication and for one month after their last dose. Ipledge Number (Optional): [id number] Weight Units: pounds Xerosis Normal Treatment: I recommended application of Cetaphil or CeraVe numerous times a day going to bed to all dry areas. Months Of Therapy Completed: 1 Use Therapeutic Ranged Or Therapeutic Target: Target Pounds Preamble Statement (Weight Entered In Details Tab): Reported Weight in pounds: 135 Next Month's Dosage: Continue Current Dosage Xerosis Aggressive Treatment: I recommended application of Cetaphil or CeraVe numerous times a day going to bed to all dry areas. I also prescribed a topical steroid for twice daily use. Kilograms Preamble Statement (Weight Entered In Details Tab): Reported Weight in kilograms: Female Completion Statement: After discussing her treatment course we decided to discontinue isotretinoin therapy at this time. I explained that she would need to continue her birth control methods for at least one month after the last dosage. She should also get a pregnancy test one month after the last dose. She shouldn't donate blood for one month after the last dose. She should call with any new symptoms of depression. Lower Range (In Mg/Kg): 120